# Patient Record
Sex: MALE | Race: OTHER | HISPANIC OR LATINO | URBAN - METROPOLITAN AREA
[De-identification: names, ages, dates, MRNs, and addresses within clinical notes are randomized per-mention and may not be internally consistent; named-entity substitution may affect disease eponyms.]

---

## 2020-08-25 ENCOUNTER — EMERGENCY (EMERGENCY)
Facility: HOSPITAL | Age: 25
LOS: 1 days | Discharge: HOME | End: 2020-08-25
Attending: EMERGENCY MEDICINE | Admitting: EMERGENCY MEDICINE
Payer: SELF-PAY

## 2020-08-25 VITALS
TEMPERATURE: 98 F | RESPIRATION RATE: 17 BRPM | OXYGEN SATURATION: 98 % | HEART RATE: 93 BPM | DIASTOLIC BLOOD PRESSURE: 60 MMHG | SYSTOLIC BLOOD PRESSURE: 122 MMHG

## 2020-08-25 PROCEDURE — 99282 EMERGENCY DEPT VISIT SF MDM: CPT

## 2020-08-25 NOTE — ED PROVIDER NOTE - PROGRESS NOTE DETAILS
Ben: Discussed with patient his alcohol abuse, denies having a "problem" and decline any assistance/referral Patient reassessed, A&Ox4. Tolerated PO. Ambulate to ambulate. Lives in [place], feels ok to go home. Jamil Griffith, BRENDA PGY2

## 2020-08-25 NOTE — SBIRT NOTE ADULT - NSSBIRTALCPASSREFTXDET_GEN_A_CORE
Revised 7/7/2015  Provided SBIRT services: Full screen positive. Referral to Treatment Performed. Screening results were reviewed with the patient and patient was provided information about healthy guidelines and potential negative consequences associated with level of risk. Motivation and readiness to reduce or stop use was discussed and goals and activities to make changes were suggested/offered. Referral for complete assessment and level of care determination at a certified treatment facility was completed by giving the patient information for treatment facilities that met their needs and encouraging them to call for an appointment. A call was not made to a facility because patient not interested at this time. Provided pt info for [facility] outpatient program and [facility] in [place] ([place] services available). Pt is already enrolled in [employer] - peer support and external navigation services.

## 2020-08-25 NOTE — ED PROVIDER NOTE - PSH
No significant past surgical history Pt presents to the ER complaining of generalized chest pain that started 5 days ago. Pt denies any other symptoms.

## 2020-08-25 NOTE — ED PROVIDER NOTE - OBJECTIVE STATEMENT
Attending/Ben: 26 yo M BIBEMS after being found on sidewalk with intoxication. No reports of trauma. Pt in the ED denies acute complaints, admits to drinking beer with friends. Denies having an alcohol dependence issue. Attending/Ben: 24 yo M BIBEMS after being found on sidewalk with intoxication. No reports of trauma. Pt in the ED denies acute complaints, admits to drinking beer with friends. Denies having an alcohol dependence issue.    Jamil Griffith, BRENDA PGY2: 25M BIBEMS for intox. Pt states that he had about 6 large coronas today, and feels sleepy. But denies any pain, falls, injury. No pmhx, does not drink every day.

## 2020-08-25 NOTE — ED ADULT TRIAGE NOTE - CHIEF COMPLAINT QUOTE
pt found passed out, admits to ETOH use. states "8 drinks"  pt malodorous, ants noted to be crawling on pts ear. pt found passed out, admits to ETOH use. states "8 drinks"  pt malodorous, ants noted to be crawling on pts ear.  pt undressed, belongings placed in locker by #21

## 2020-08-25 NOTE — ED ADULT NURSE NOTE - OBJECTIVE STATEMENT
pt received in rm 11 AAO x 3. pt brought in by EMS. as per traige note pt found passed out. pt primarily Bangladeshi speaking and able to communicate with pt in Bangladeshi. pt states he does not know why he is here. pt admits to drinking 6 beers. pt denies sob, chest pain, n/v/d, fevers, chills, cough. respirations even and unlabored. will continue to monitor.

## 2020-08-25 NOTE — ED ADULT NURSE NOTE - CHIEF COMPLAINT QUOTE
pt found passed out, admits to ETOH use. states "8 drinks"  pt malodorous, ants noted to be crawling on pts ear.  pt undressed, belongings placed in locker by #21

## 2020-08-25 NOTE — ED PROVIDER NOTE - PHYSICAL EXAMINATION
Attending/Ben: Disheveled, NAD; PERRL/EOMI, non-icterus, supple, no KAUR, no JVD, RRR, CTAB; Abd-soft, NT/ND, no HSM; no LE edema, A&Ox3, nonfocal; Skin-warm/dry

## 2020-08-25 NOTE — ED PROVIDER NOTE - PATIENT PORTAL LINK FT
You can access the FollowMyHealth Patient Portal offered by [facility] by registering at the following website: [email or web address]. By joining [facility]’s FollowMyHealth portal, you will also be able to view your health information using other applications (apps) compatible with our system.

## 2020-08-25 NOTE — ED PROVIDER NOTE - NSFOLLOWUPINSTRUCTIONS_ED_ALL_ED_FT
Please see attached information on alcohol use.     Return to the ER immediately if you have any new pain or concerning symptoms.     Please follow up with your primary care doctor in 7-10 days.